# Patient Record
Sex: FEMALE | Race: WHITE | Employment: OTHER | URBAN - METROPOLITAN AREA
[De-identification: names, ages, dates, MRNs, and addresses within clinical notes are randomized per-mention and may not be internally consistent; named-entity substitution may affect disease eponyms.]

---

## 2018-11-11 ENCOUNTER — HOSPITAL ENCOUNTER (OUTPATIENT)
Age: 73
Discharge: HOME | End: 2018-11-11
Attending: FAMILY MEDICINE
Payer: MEDICARE

## 2018-11-11 ENCOUNTER — APPOINTMENT (OUTPATIENT)
Dept: GENERAL RADIOLOGY | Age: 73
End: 2018-11-11
Attending: FAMILY MEDICINE
Payer: MEDICARE

## 2018-11-11 VITALS
WEIGHT: 160 LBS | OXYGEN SATURATION: 96 % | DIASTOLIC BLOOD PRESSURE: 80 MMHG | RESPIRATION RATE: 22 BRPM | HEIGHT: 67 IN | SYSTOLIC BLOOD PRESSURE: 141 MMHG | BODY MASS INDEX: 25.11 KG/M2 | HEART RATE: 69 BPM | TEMPERATURE: 98 F

## 2018-11-11 DIAGNOSIS — R05.8 POST-VIRAL COUGH SYNDROME: Primary | ICD-10-CM

## 2018-11-11 PROCEDURE — 99203 OFFICE O/P NEW LOW 30 MIN: CPT

## 2018-11-11 PROCEDURE — 71046 X-RAY EXAM CHEST 2 VIEWS: CPT | Performed by: FAMILY MEDICINE

## 2018-11-11 RX ORDER — BENZONATATE 100 MG/1
100 CAPSULE ORAL 3 TIMES DAILY PRN
Qty: 21 CAPSULE | Refills: 0 | Status: SHIPPED | OUTPATIENT
Start: 2018-11-11 | End: 2018-11-18

## 2018-11-11 NOTE — ED NOTES
All orders complete pt leaving IC stable no acute distress noted, RX given and explained pt verbalizes DC and follow up instructions

## 2018-11-11 NOTE — ED PROVIDER NOTES
Patient Seen in: [address]    History   Patient presents with:  Cough/URI    Stated Complaint: cough    HPI    Patient here with cough, congestion for 10 days. No travel, no known sick contacts.   Patient denies sig short murmur  EXTREMITIES: no cyanosis, clubbing or edema  GI: soft, non-tender, normal bowel sounds  SKIN: good skin turgor, no obvious rashes  Differential to include: URI vs. rhinonsinusitis vs. Bronchitis vs. Pneumonia         ED Course   Labs Reviewed - No

## 2019-09-03 ENCOUNTER — HOSPITAL (OUTPATIENT)
Dept: OTHER | Age: 74
End: 2019-09-03

## 2020-03-13 ENCOUNTER — HOSPITAL (OUTPATIENT)
Dept: OTHER | Age: 75
End: 2020-03-13
Attending: ORTHOPAEDIC SURGERY